# Patient Record
Sex: MALE | Race: WHITE | URBAN - NONMETROPOLITAN AREA
[De-identification: names, ages, dates, MRNs, and addresses within clinical notes are randomized per-mention and may not be internally consistent; named-entity substitution may affect disease eponyms.]

---

## 2020-02-17 ENCOUNTER — IMPORTED ENCOUNTER (OUTPATIENT)
Dept: URBAN - NONMETROPOLITAN AREA CLINIC 1 | Facility: CLINIC | Age: 40
End: 2020-02-17

## 2020-02-17 PROBLEM — H00.11: Noted: 2020-02-17

## 2020-02-17 PROCEDURE — 99202 OFFICE O/P NEW SF 15 MIN: CPT

## 2020-02-17 NOTE — PATIENT DISCUSSION
Chalazion RUL -Discussed findings of exam in detail with the patient.-Discussed the nature of the condition and treatment options with the patient.-The patient was instructed on the use of warm compresses.-The patient was instructed on the use of lid scrubs. -Discussed surgical incision and drainage of the chalazion.; 's Notes: capo Sargent

## 2020-05-08 ENCOUNTER — IMPORTED ENCOUNTER (OUTPATIENT)
Dept: URBAN - NONMETROPOLITAN AREA CLINIC 1 | Facility: CLINIC | Age: 40
End: 2020-05-08

## 2020-05-08 PROCEDURE — 67800 REMOVE EYELID LESION: CPT

## 2020-05-08 NOTE — PATIENT DISCUSSION
Chalazion RUL -  Discussed findings of exam in detail with the patient. -  Discussed the nature of the condition and treatment options with the patient. -  The patient was instructed on the use of warm compresses. -  The patient was instructed on the use of lid scrubs. -  patient had tried drops and warm compresses with no relief. -  Discussed surgical incision and drainage of the chalazion. -  patient elects to proceed with incision and drainage today-  consent form signed-  incision and drainage prefrmes well tolerated.  -  start hot compresses QHS -  RTC PRN; 's Notes: sees Lauren Hernandez

## 2022-04-09 ASSESSMENT — TONOMETRY
OS_IOP_MMHG: 14
OD_IOP_MMHG: 14

## 2022-04-09 ASSESSMENT — VISUAL ACUITY
OS_CC: 20/25+
OD_CC: 20/20
OD_CC: 20/20
OS_CC: 20/20